# Patient Record
Sex: MALE | Race: WHITE | NOT HISPANIC OR LATINO | Employment: FULL TIME | URBAN - NONMETROPOLITAN AREA
[De-identification: names, ages, dates, MRNs, and addresses within clinical notes are randomized per-mention and may not be internally consistent; named-entity substitution may affect disease eponyms.]

---

## 2020-03-02 ENCOUNTER — OFFICE VISIT (OUTPATIENT)
Dept: FAMILY MEDICINE CLINIC | Facility: CLINIC | Age: 45
End: 2020-03-02
Payer: COMMERCIAL

## 2020-03-02 ENCOUNTER — APPOINTMENT (OUTPATIENT)
Dept: LAB | Facility: CLINIC | Age: 45
End: 2020-03-02
Payer: COMMERCIAL

## 2020-03-02 VITALS
WEIGHT: 207.6 LBS | SYSTOLIC BLOOD PRESSURE: 122 MMHG | BODY MASS INDEX: 30.75 KG/M2 | DIASTOLIC BLOOD PRESSURE: 80 MMHG | OXYGEN SATURATION: 99 % | HEIGHT: 69 IN | HEART RATE: 64 BPM

## 2020-03-02 DIAGNOSIS — J33.9 NASAL POLYPOSIS: ICD-10-CM

## 2020-03-02 DIAGNOSIS — Z00.00 ANNUAL PHYSICAL EXAM: Primary | ICD-10-CM

## 2020-03-02 DIAGNOSIS — Z00.00 ANNUAL PHYSICAL EXAM: ICD-10-CM

## 2020-03-02 LAB
ALBUMIN SERPL BCP-MCNC: 3.9 G/DL (ref 3.5–5)
ALP SERPL-CCNC: 63 U/L (ref 46–116)
ALT SERPL W P-5'-P-CCNC: 64 U/L (ref 12–78)
ANION GAP SERPL CALCULATED.3IONS-SCNC: 5 MMOL/L (ref 4–13)
AST SERPL W P-5'-P-CCNC: 29 U/L (ref 5–45)
BASOPHILS # BLD AUTO: 0.05 THOUSANDS/ΜL (ref 0–0.1)
BASOPHILS NFR BLD AUTO: 1 % (ref 0–1)
BILIRUB SERPL-MCNC: 0.69 MG/DL (ref 0.2–1)
BUN SERPL-MCNC: 18 MG/DL (ref 5–25)
CALCIUM SERPL-MCNC: 8.9 MG/DL (ref 8.3–10.1)
CHLORIDE SERPL-SCNC: 108 MMOL/L (ref 100–108)
CHOLEST SERPL-MCNC: 237 MG/DL (ref 50–200)
CO2 SERPL-SCNC: 29 MMOL/L (ref 21–32)
CREAT SERPL-MCNC: 1.13 MG/DL (ref 0.6–1.3)
EOSINOPHIL # BLD AUTO: 0.46 THOUSAND/ΜL (ref 0–0.61)
EOSINOPHIL NFR BLD AUTO: 7 % (ref 0–6)
ERYTHROCYTE [DISTWIDTH] IN BLOOD BY AUTOMATED COUNT: 12.3 % (ref 11.6–15.1)
GFR SERPL CREATININE-BSD FRML MDRD: 80 ML/MIN/1.73SQ M
GLUCOSE P FAST SERPL-MCNC: 113 MG/DL (ref 65–99)
HCT VFR BLD AUTO: 50.5 % (ref 36.5–49.3)
HDLC SERPL-MCNC: 34 MG/DL
HGB BLD-MCNC: 17 G/DL (ref 12–17)
IMM GRANULOCYTES # BLD AUTO: 0.01 THOUSAND/UL (ref 0–0.2)
IMM GRANULOCYTES NFR BLD AUTO: 0 % (ref 0–2)
LDLC SERPL CALC-MCNC: 169 MG/DL (ref 0–100)
LYMPHOCYTES # BLD AUTO: 2.57 THOUSANDS/ΜL (ref 0.6–4.47)
LYMPHOCYTES NFR BLD AUTO: 38 % (ref 14–44)
MCH RBC QN AUTO: 30.7 PG (ref 26.8–34.3)
MCHC RBC AUTO-ENTMCNC: 33.7 G/DL (ref 31.4–37.4)
MCV RBC AUTO: 91 FL (ref 82–98)
MONOCYTES # BLD AUTO: 0.38 THOUSAND/ΜL (ref 0.17–1.22)
MONOCYTES NFR BLD AUTO: 6 % (ref 4–12)
NEUTROPHILS # BLD AUTO: 3.24 THOUSANDS/ΜL (ref 1.85–7.62)
NEUTS SEG NFR BLD AUTO: 48 % (ref 43–75)
NONHDLC SERPL-MCNC: 203 MG/DL
NRBC BLD AUTO-RTO: 0 /100 WBCS
PLATELET # BLD AUTO: 168 THOUSANDS/UL (ref 149–390)
PMV BLD AUTO: 9.7 FL (ref 8.9–12.7)
POTASSIUM SERPL-SCNC: 4.3 MMOL/L (ref 3.5–5.3)
PROT SERPL-MCNC: 7.3 G/DL (ref 6.4–8.2)
RBC # BLD AUTO: 5.54 MILLION/UL (ref 3.88–5.62)
SODIUM SERPL-SCNC: 142 MMOL/L (ref 136–145)
TRIGL SERPL-MCNC: 171 MG/DL
TSH SERPL DL<=0.05 MIU/L-ACNC: 1.31 UIU/ML (ref 0.36–3.74)
WBC # BLD AUTO: 6.71 THOUSAND/UL (ref 4.31–10.16)

## 2020-03-02 PROCEDURE — 99396 PREV VISIT EST AGE 40-64: CPT | Performed by: FAMILY MEDICINE

## 2020-03-02 PROCEDURE — 80053 COMPREHEN METABOLIC PANEL: CPT

## 2020-03-02 PROCEDURE — 84443 ASSAY THYROID STIM HORMONE: CPT

## 2020-03-02 PROCEDURE — 80061 LIPID PANEL: CPT

## 2020-03-02 PROCEDURE — 85025 COMPLETE CBC W/AUTO DIFF WBC: CPT

## 2020-03-02 PROCEDURE — 36415 COLL VENOUS BLD VENIPUNCTURE: CPT

## 2020-03-02 RX ORDER — DIPHENOXYLATE HYDROCHLORIDE AND ATROPINE SULFATE 2.5; .025 MG/1; MG/1
1 TABLET ORAL DAILY
COMMUNITY

## 2020-03-02 RX ORDER — FLUTICASONE PROPIONATE 50 MCG
1 SPRAY, SUSPENSION (ML) NASAL DAILY
Qty: 1 BOTTLE | Refills: 5 | Status: SHIPPED | OUTPATIENT
Start: 2020-03-02

## 2020-03-02 NOTE — PROGRESS NOTES
Assessment/Plan:    Annual physical exam  Patient presented to the office today for his annual physical   Exam is unremarkable other than nasal congestion with nasal polyposis  He has a history of nasal polyps and has seen ENT for this in the past   He refused surgery  I prescribed Flonase  I have also recommended saline nasal spray  I ordered routine fasting blood work  I will contact the patient with results  Diagnoses and all orders for this visit:    Annual physical exam  -     CBC and differential; Future  -     Comprehensive metabolic panel; Future  -     Lipid panel; Future  -     TSH, 3rd generation with Free T4 reflex; Future    Nasal polyposis  -     fluticasone (FLONASE) 50 mcg/act nasal spray; 1 spray into each nostril daily    Other orders  -     multivitamin (THERAGRAN) TABS; Take 1 tablet by mouth daily          SuBMI Counseling: Body mass index is 30 66 kg/m²  The BMI is above normal  Nutrition recommendations include decreasing portion sizes, encouraging healthy choices of fruits and vegetables, decreasing fast food intake, consuming healthier snacks, limiting drinks that contain sugar and moderation in carbohydrate intake  Exercise recommendations include exercising 3-5 times per week  No pharmacotherapy was ordered  bjective:      Patient ID: Zhang Stallings is a 45 y o  male  This patient is a 57-year-old white male presents to the office today for his annual physical   Patient states that his insurance for requires him to have an annual physical in order for him to get a discount on his healthcare insurance  Patient feels well and has no complaints  He is exercising regularly  He tells me he gets frustrated because he thinks his muscle should be bigger than they are        The following portions of the patient's history were reviewed and updated as appropriate: allergies, current medications, past family history, past medical history, past social history, past surgical history and problem list     Review of Systems   HENT: Positive for congestion  Respiratory: Negative for cough, shortness of breath and wheezing  Cardiovascular: Negative for chest pain, palpitations and leg swelling  Gastrointestinal: Negative for abdominal distention, abdominal pain, blood in stool, constipation, diarrhea, nausea and vomiting  Objective:      /80 (BP Location: Left arm, Patient Position: Sitting, Cuff Size: Large)   Pulse 64   Ht 5' 9" (1 753 m)   Wt 94 2 kg (207 lb 9 6 oz)   SpO2 99%   BMI 30 66 kg/m²          Physical Exam   Constitutional: He appears well-developed and well-nourished  No distress  HENT:   Head: Normocephalic and atraumatic  Right Ear: External ear normal    Left Ear: External ear normal    Mouth/Throat: Oropharynx is clear and moist  No oropharyngeal exudate  Tympanic membranes are normal in appearance  Nasal mucosa was somewhat dry  Nasal polyps are present left more so than right   Eyes: Pupils are equal, round, and reactive to light  Conjunctivae are normal  No scleral icterus  Neck: Neck supple  No tracheal deviation present  No thyromegaly present  Cardiovascular: Normal rate, regular rhythm and normal heart sounds  Exam reveals no gallop and no friction rub  No murmur heard  Pulmonary/Chest: Effort normal and breath sounds normal  No stridor  No respiratory distress  He has no wheezes  He has no rales  Abdominal: Soft  Bowel sounds are normal  He exhibits no distension and no mass  There is no tenderness  There is no rebound and no guarding  There is no organomegaly noted   Musculoskeletal: Normal range of motion  He exhibits no edema, tenderness or deformity  Lymphadenopathy:     He has no cervical adenopathy  Psychiatric: He has a normal mood and affect  His behavior is normal  Judgment and thought content normal    Vitals reviewed      extremities:  Without cyanosis, clubbing, or edema

## 2020-03-03 NOTE — ASSESSMENT & PLAN NOTE
Patient presented to the office today for his annual physical   Exam is unremarkable other than nasal congestion with nasal polyposis  He has a history of nasal polyps and has seen ENT for this in the past   He refused surgery  I prescribed Flonase  I have also recommended saline nasal spray  I ordered routine fasting blood work  I will contact the patient with results

## 2020-07-14 ENCOUNTER — OFFICE VISIT (OUTPATIENT)
Dept: FAMILY MEDICINE CLINIC | Facility: CLINIC | Age: 45
End: 2020-07-14
Payer: COMMERCIAL

## 2020-07-14 VITALS
HEIGHT: 69 IN | OXYGEN SATURATION: 98 % | WEIGHT: 216.6 LBS | HEART RATE: 99 BPM | DIASTOLIC BLOOD PRESSURE: 80 MMHG | BODY MASS INDEX: 32.08 KG/M2 | TEMPERATURE: 98.1 F | SYSTOLIC BLOOD PRESSURE: 132 MMHG

## 2020-07-14 DIAGNOSIS — M75.51 BURSITIS OF RIGHT SHOULDER: Primary | ICD-10-CM

## 2020-07-14 PROCEDURE — 3008F BODY MASS INDEX DOCD: CPT | Performed by: FAMILY MEDICINE

## 2020-07-14 PROCEDURE — 99212 OFFICE O/P EST SF 10 MIN: CPT | Performed by: FAMILY MEDICINE

## 2020-07-14 PROCEDURE — 1036F TOBACCO NON-USER: CPT | Performed by: FAMILY MEDICINE

## 2020-07-14 RX ORDER — DICLOFENAC SODIUM 75 MG/1
75 TABLET, DELAYED RELEASE ORAL 2 TIMES DAILY
Qty: 60 TABLET | Refills: 0 | Status: SHIPPED | OUTPATIENT
Start: 2020-07-14 | End: 2020-11-09 | Stop reason: SDUPTHER

## 2020-07-14 NOTE — PROGRESS NOTES
Assessment/Plan:    Bursitis of right shoulder  Patient has been instructed to rest the shoulder  He will apply ice to the affected area 2-3 times per day for 10-15 minutes at a time  I started him on oral NSAIDs  He will take 1 twice a day with food  I think his shoulder will improve with these simple measures  If not, he should contact me  We would then bring him back to the office and consider a cortisone injection  Diagnoses and all orders for this visit:    Bursitis of right shoulder  -     diclofenac (VOLTAREN) 75 mg EC tablet; Take 1 tablet (75 mg total) by mouth 2 (two) times a day  -     Large joint arthrocentesis: R subacromial bursa  -     lidocaine (PF) (XYLOCAINE-MPF) 2 % injection 1 mL  -     methylPREDNISolone acetate (DEPO-MEDROL) injection 0 5 mL        Large joint arthrocentesis: R subacromial bursa  Date/Time: 7/22/2020 9:03 AM  Consent given by: patient  Supporting Documentation  Indications: pain   Procedure Details  Location: shoulder - R subacromial bursa  Needle size: 25 G  Ultrasound guidance: no  Approach: lateral  Medications administered: 1 mL lidocaine (PF) 2 %; 0 5 mL methylPREDNISolone acetate 80 mg/mL    Patient tolerance: patient tolerated the procedure well with no immediate complications  Dressing:  Sterile dressing applied        Subjective:      Patient ID: Alexey Diaz is a 45 y o  male  This patient is a 22-year-old white male who presents to the office today complaining of right shoulder pain x3 months duration  He is right-hand dominant  It began when he grabbed a door at work and open suddenly  He tells me was actually already sore for a while before that but that is really what put him over the edge  He finds it difficult to find a comfortable position to sleep at night  He is still lifting weights  Shoulder has not been getting better and as such, he scheduled the appointment  Pain does not radiate  Denies neck pain        The following portions of the patient's history were reviewed and updated as appropriate: allergies, current medications, past family history, past medical history, past social history, past surgical history and problem list     Review of Systems   Musculoskeletal: Negative for neck pain and neck stiffness  Neurological: Negative for weakness and numbness  Objective:      /80 (BP Location: Left arm, Patient Position: Sitting, Cuff Size: Large)   Pulse 99   Temp 98 1 °F (36 7 °C) (Temporal)   Ht 5' 8 5" (1 74 m)   Wt 98 2 kg (216 lb 9 6 oz)   SpO2 98%   BMI 32 45 kg/m²          Physical Exam   Constitutional: He appears well-developed and well-nourished  No distress  Musculoskeletal:   Patient has full range of motion of the shoulders in all planes of movement  Empty can sign is negative  Nelson impingement sign is negative  Neer impingement sign is negative  There was a positive painful arc  Apprehension test was negative  Sulcus test was negative  There is no tenderness noted to palpation  There is full range of motion of the cervical spine in all planes of movement  No paraspinal cervical muscle spasms or tenderness   Neurological: He displays normal reflexes  Cervical compression test is negative   Vitals reviewed

## 2020-07-14 NOTE — PATIENT INSTRUCTIONS
Shoulder Bursitis   WHAT YOU NEED TO KNOW:   What is shoulder bursitis? Shoulder bursitis is inflammation of the bursa in your shoulder  The bursa is a fluid-filled sac that acts as a cushion between a bone and a tendon  A tendon is a cord of strong tissue that connects muscles to bones  What causes shoulder bursitis? · An injury, such as a fall    · Bacterial infection    · Overuse of the shoulder, such as when you paint or swim    · Bony growths that rub against and irritate the bursa and tendons  What are the signs and symptoms of shoulder bursitis? · Pain when you move your shoulder or raise your arm over your head    · Decreased movement of your arm and shoulder    · Redness or swelling    · Crunching or popping when you move your shoulder    · Shoulder and arm weakness  How is shoulder bursitis diagnosed? Your healthcare provider will examine your shoulder and ask about your injury or activities  You may need any of the following:  · Blood tests: You may need blood drawn to check for infection  Healthcare providers may also check for diseases that may be causing your bursitis, such as rheumatoid arthritis  · X-rays: These pictures will show bone position problems, arthritis, or a fracture  · MRI:  This scan uses powerful magnets and a computer to take pictures of your shoulder  An MRI may show tissue damage or arthritis  You may be given dye to help the pictures show up better  Tell the healthcare provider if you have ever had an allergic reaction to contrast dye  Do not enter the MRI room with anything metal  Metal can cause serious injury  Tell the healthcare provider if you have any metal in or on your body  · Fluid culture:  Healthcare providers use a needle to drain fluid from your bursa  The fluid will be sent to a lab and tested for infection  Removal of bursa fluid may also help relieve your symptoms  How is shoulder bursitis treated?    · Medicine:      ¨ NSAIDs:  These medicines decrease swelling, pain, and fever  NSAIDs are available without a doctor's order  Ask your healthcare provider which medicine is right for you  Ask how much to take and when to take it  Take as directed  NSAIDs can cause stomach bleeding and kidney problems if not taken correctly  ¨ Antibiotics: These help fight an infection caused by bacteria  You may need antibiotics if your bursitis is caused by infection  ¨ Steroid injection: This shot will help decrease pain and swelling  · Bursectomy: This is surgery to remove your bursa  It is only done when other treatments do not work  What are the risks of shoulder bursitis? The infection may spread to nearby joints  You may have long-term bursitis  This may include pain and severe limitation of movement  How can I manage my symptoms? · Rest:  Rest your shoulder as much as possible to decrease pain and swelling  Slowly start to do more each day  Return to your daily activities as directed  · Ice:  Ice helps decrease swelling and pain  Ice may also help prevent tissue damage  Use an ice pack, or put crushed ice in a plastic bag  Cover it with a towel and place it on your shoulder for 15 to 20 minutes, 3 to 4 times each day, as directed  · Heat:  Heat helps decrease pain and stiffness  Apply heat on the area for 15 to 20 minutes, 3 to 4 times each day, as directed  · Sleep position:  Try to avoid lying on your injured shoulder  You may be more comfortable if you sleep on your stomach or back  · Physical therapy:  A physical therapist teaches you exercises to help improve movement and strength, and to decrease pain  How can I prevent shoulder bursitis? Always stretch and do warmup and cool-down exercises before and after you exercise  This will help loosen your muscles and decrease stress on your shoulder  Rest between workouts  When should I contact my healthcare provider? · Your redness, pain, and swelling increase      · Your symptoms do not improve with treatment  · You have a fever  · You have questions or concerns about your condition or care  CARE AGREEMENT:   You have the right to help plan your care  Learn about your health condition and how it may be treated  Discuss treatment options with your caregivers to decide what care you want to receive  You always have the right to refuse treatment  The above information is an  only  It is not intended as medical advice for individual conditions or treatments  Talk to your doctor, nurse or pharmacist before following any medical regimen to see if it is safe and effective for you  © 2017 [facility] Information is for End User's use only and may not be sold, redistributed or otherwise used for commercial purposes  All illustrations and images included in CareNotes® are the copyrighted property of A D A M , Inc  or [employer]

## 2020-07-22 PROBLEM — M75.51 BURSITIS OF RIGHT SHOULDER: Status: ACTIVE | Noted: 2020-07-22

## 2020-07-22 RX ORDER — METHYLPREDNISOLONE ACETATE 80 MG/ML
0.5 INJECTION, SUSPENSION INTRA-ARTICULAR; INTRALESIONAL; INTRAMUSCULAR; SOFT TISSUE
Status: DISCONTINUED | OUTPATIENT
Start: 2020-07-22 | End: 2020-08-28

## 2020-07-22 RX ORDER — LIDOCAINE HYDROCHLORIDE 20 MG/ML
1 INJECTION, SOLUTION EPIDURAL; INFILTRATION; INTRACAUDAL; PERINEURAL
Status: DISCONTINUED | OUTPATIENT
Start: 2020-07-22 | End: 2020-08-28

## 2020-07-22 NOTE — ASSESSMENT & PLAN NOTE
Patient has been instructed to rest the shoulder  He will apply ice to the affected area 2-3 times per day for 10-15 minutes at a time  I started him on oral NSAIDs  He will take 1 twice a day with food  I think his shoulder will improve with these simple measures  If not, he should contact me  We would then bring him back to the office and consider a cortisone injection

## 2020-08-03 ENCOUNTER — TELEPHONE (OUTPATIENT)
Dept: FAMILY MEDICINE CLINIC | Facility: CLINIC | Age: 45
End: 2020-08-03

## 2020-08-03 NOTE — TELEPHONE ENCOUNTER
Patient called  Patient stated that you did not inject his shoulder the day he was here  He is getting billed for services that were not performed  Stated that you just checked his shoulder and moved it around  Stated that you prescribed medication and he has been using that  Please advise

## 2020-08-28 NOTE — PROGRESS NOTES
Assessment/Plan:    Bursitis of right shoulder  Patient has been instructed to rest the shoulder  He will apply ice to the affected area 2-3 times per day for 10-15 minutes at a time  I started him on oral NSAIDs  He will take 1 twice a day with food  I think his shoulder will improve with these simple measures  If not, he should contact me  We would then bring him back to the office and consider a cortisone injection  Diagnoses and all orders for this visit:    Bursitis of right shoulder  -     diclofenac (VOLTAREN) 75 mg EC tablet; Take 1 tablet (75 mg total) by mouth 2 (two) times a day  -     Cancel: Large joint arthrocentesis: R subacromial bursa  -     Discontinue: lidocaine (PF) (XYLOCAINE-MPF) 2 % injection 1 mL  -     Discontinue: methylPREDNISolone acetate (DEPO-MEDROL) injection 0 5 mL          Subjective:      Patient ID: Mihaela Pires is a 45 y o  male  This patient is a 53-year-old white male presents to the office today complaining of right shoulder pain  His shoulder pain began 3 months ago  He is right-hand dominant  He was at work  He tells me he grabbed a door which suddenly opened  This isaías his shoulder  He tells me it was already sore for a while before this  However, he claims this put him over the edge  He is still lifting weights  He tells me he scheduled the appointment  The shoulder has been getting better but he decided to keep the appointment because it was already scheduled  He denies any radiation of the pain  He denies any pain in his neck  The following portions of the patient's history were reviewed and updated as appropriate: allergies, current medications, past family history, past medical history, past social history, past surgical history and problem list     Review of Systems   Musculoskeletal: Negative for neck pain and neck stiffness  Denies any pain radiating into the arm   Neurological: Negative for weakness and numbness  Objective:      /80 (BP Location: Left arm, Patient Position: Sitting, Cuff Size: Large)   Pulse 99   Temp 98 1 °F (36 7 °C) (Temporal)   Ht 5' 8 5" (1 74 m)   Wt 98 2 kg (216 lb 9 6 oz)   SpO2 98%   BMI 32 45 kg/m²          Physical Exam  Constitutional:       General: He is not in acute distress  Appearance: He is well-developed and well-nourished  Cardiovascular:      Rate and Rhythm: Normal rate and regular rhythm  Heart sounds: S1 normal and S2 normal  No murmur  No friction rub  Pulmonary:      Effort: Pulmonary effort is normal       Breath sounds: Normal breath sounds  No wheezing or rales  Musculoskeletal:      Comments: The empty can sign is negative  Nelson impingement sign is negative  Neer impingement sign is negative  There is a mildly positive painful arc  There is full range of motion of the shoulders  There is a negative apprehension test and a negative sulcus test    Neurological:      Comments: Deep tendon reflexes are 2/4  Motor strength is 5/5  Cervical compression test is negative

## 2020-11-09 ENCOUNTER — OFFICE VISIT (OUTPATIENT)
Dept: FAMILY MEDICINE CLINIC | Facility: CLINIC | Age: 45
End: 2020-11-09
Payer: COMMERCIAL

## 2020-11-09 VITALS
WEIGHT: 217.9 LBS | HEIGHT: 69 IN | TEMPERATURE: 98.5 F | BODY MASS INDEX: 32.27 KG/M2 | HEART RATE: 86 BPM | SYSTOLIC BLOOD PRESSURE: 136 MMHG | DIASTOLIC BLOOD PRESSURE: 78 MMHG | OXYGEN SATURATION: 97 %

## 2020-11-09 DIAGNOSIS — M75.51 BURSITIS OF RIGHT SHOULDER: ICD-10-CM

## 2020-11-09 PROCEDURE — 20610 DRAIN/INJ JOINT/BURSA W/O US: CPT | Performed by: FAMILY MEDICINE

## 2020-11-09 PROCEDURE — 3008F BODY MASS INDEX DOCD: CPT | Performed by: FAMILY MEDICINE

## 2020-11-09 PROCEDURE — 99212 OFFICE O/P EST SF 10 MIN: CPT | Performed by: FAMILY MEDICINE

## 2020-11-09 PROCEDURE — 1036F TOBACCO NON-USER: CPT | Performed by: FAMILY MEDICINE

## 2020-11-09 RX ORDER — LIDOCAINE HYDROCHLORIDE 20 MG/ML
1 INJECTION, SOLUTION EPIDURAL; INFILTRATION; INTRACAUDAL; PERINEURAL
Status: COMPLETED | OUTPATIENT
Start: 2020-11-09 | End: 2020-11-09

## 2020-11-09 RX ORDER — METHYLPREDNISOLONE ACETATE 80 MG/ML
0.5 INJECTION, SUSPENSION INTRA-ARTICULAR; INTRALESIONAL; INTRAMUSCULAR; SOFT TISSUE
Status: COMPLETED | OUTPATIENT
Start: 2020-11-09 | End: 2020-11-09

## 2020-11-09 RX ORDER — DICLOFENAC SODIUM 75 MG/1
TABLET, DELAYED RELEASE ORAL
Qty: 60 TABLET | Refills: 1 | Status: SHIPPED | OUTPATIENT
Start: 2020-11-09

## 2020-11-09 RX ADMIN — METHYLPREDNISOLONE ACETATE 0.5 ML: 80 INJECTION, SUSPENSION INTRA-ARTICULAR; INTRALESIONAL; INTRAMUSCULAR; SOFT TISSUE at 18:51

## 2020-11-09 RX ADMIN — LIDOCAINE HYDROCHLORIDE 1 ML: 20 INJECTION, SOLUTION EPIDURAL; INFILTRATION; INTRACAUDAL; PERINEURAL at 18:51

## 2021-04-08 ENCOUNTER — TELEPHONE (OUTPATIENT)
Dept: FAMILY MEDICINE CLINIC | Facility: CLINIC | Age: 46
End: 2021-04-08

## 2021-09-21 ENCOUNTER — TELEMEDICINE (OUTPATIENT)
Dept: FAMILY MEDICINE CLINIC | Facility: CLINIC | Age: 46
End: 2021-09-21
Payer: COMMERCIAL

## 2021-09-21 VITALS — TEMPERATURE: 98.4 F | WEIGHT: 214 LBS | HEIGHT: 68 IN | BODY MASS INDEX: 32.43 KG/M2

## 2021-09-21 DIAGNOSIS — B34.9 VIRAL INFECTION, UNSPECIFIED: Primary | ICD-10-CM

## 2021-09-21 PROCEDURE — U0003 INFECTIOUS AGENT DETECTION BY NUCLEIC ACID (DNA OR RNA); SEVERE ACUTE RESPIRATORY SYNDROME CORONAVIRUS 2 (SARS-COV-2) (CORONAVIRUS DISEASE [COVID-19]), AMPLIFIED PROBE TECHNIQUE, MAKING USE OF HIGH THROUGHPUT TECHNOLOGIES AS DESCRIBED BY CMS-2020-01-R: HCPCS | Performed by: FAMILY MEDICINE

## 2021-09-21 PROCEDURE — U0005 INFEC AGEN DETEC AMPLI PROBE: HCPCS | Performed by: FAMILY MEDICINE

## 2021-09-21 PROCEDURE — 99213 OFFICE O/P EST LOW 20 MIN: CPT | Performed by: FAMILY MEDICINE

## 2021-09-21 NOTE — ASSESSMENT & PLAN NOTE
Patient has symptoms which are quite compatible with COVID-19 virus infection  I recommended that the patient come to the office for COVID-19 virus test   I have also asked the patient to quarantine at home until results of the tests are back  He should also try to isolate from family members  If he is around his family, I asked him to wear mask  I asked the patient to check his temperature at least twice a day until I call him  He should call me immediately if he has any shortness of breath  I told the patient he may take Tylenol as needed for symptoms  Robitussin DM has also been recommended  Further follow-up pending COVID-19 PCR test results  A note for work has been given

## 2021-09-21 NOTE — PROGRESS NOTES
COVID-19 Outpatient Progress Note    Assessment/Plan:    Problem List Items Addressed This Visit        Other    Viral infection, unspecified - Primary      Patient has symptoms which are quite compatible with COVID-19 virus infection  I recommended that the patient come to the office for COVID-19 virus test   I have also asked the patient to quarantine at home until results of the tests are back  He should also try to isolate from family members  If he is around his family, I asked him to wear mask  I asked the patient to check his temperature at least twice a day until I call him  He should call me immediately if he has any shortness of breath  I told the patient he may take Tylenol as needed for symptoms  Robitussin DM has also been recommended  Further follow-up pending COVID-19 PCR test results  A note for work has been given  Relevant Orders    Novel Coronavirus (Covid-19),PCR [facility] - Collected in Office         Disposition:     I recommended the patient to come to our office to perform PCR testing for COVID-19  I have spent 15 minutes directly with the patient  Greater than 50% of this time was spent in counseling/coordination of care regarding: instructions for management, patient and family education and impressions  Verification of patient location:    Patient is located in the following state in which I hold an active license [place]    Encounter provider Emily Guardado DO    Provider located at [address] [place] [address] [facility] [address] [address] [phone]    Recent Visits  No visits were found meeting these conditions    Showing recent visits within past 7 days and meeting all other requirements  Today's Visits  Date Type Provider Dept   09/21/21 Telemedicine Emily Guardado DO [facility]   Showing today's visits and meeting all other requirements  Future Appointments  No visits were found meeting these conditions  Showing future appointments within next 150 days and meeting all other requirements     This virtual check-in was done via [employer] and patient was informed that this is a secure, HIPAA-compliant platform  He agrees to proceed  Patient agrees to participate in a virtual check in via telephone or video visit instead of presenting to the office to address urgent/immediate medical needs  Patient is aware this is a billable service  After connecting through [facility], the patient was identified by name and date of birth  Scott Reilly was informed that this was a telemedicine visit and that the exam was being conducted confidentially over secure lines  My office door was closed  No one else was in the room  Scott Reilly acknowledged consent and understanding of privacy and security of the telemedicine visit  I informed the patient that I have reviewed his record in Epic and presented the opportunity for him to ask any questions regarding the visit today  The patient agreed to participate  Subjective:   Scott Reilly is a 42 y o  male who is concerned about COVID-19  Patient's symptoms include nasal congestion, cough, chest tightness, myalgias and headache  Patient denies fever, chills, fatigue, malaise, rhinorrhea, sore throat, anosmia, loss of taste, shortness of breath, abdominal pain, nausea, vomiting and diarrhea       Date of symptom onset: 9/20/2021  COVID-19 vaccination status: Partially vaccinated    Exposure:   Contact with a person who is under investigation (PUI) for or who is positive for COVID-19 within the last 14 days?: No    Hospitalized recently for fever and/or lower respiratory symptoms?: No      Currently a healthcare worker that is involved in direct patient care?: No      Works in a special setting where the risk of COVID-19 transmission may be high? (this may include long-term care, correctional and retirement facilities; homeless shelters; assisted-living facilities and group homes ): No      Resident in a special setting where the risk of COVID-19 transmission may be high? (this may include long-term care, correctional and [facility] facilities; homeless shelters; assisted-living facilities and group homes ): No        Reports sick contact, a co-worker  The individual was never tested for COVID-19    No results found for: [address] [address] [facility], [facility]  History reviewed  No pertinent past medical history  Past Surgical History:   Procedure Laterality Date    HIP SURGERY  2018    JOINT REPLACEMENT  2018    WISDOM TOOTH EXTRACTION       Current Outpatient Medications   Medication Sig Dispense Refill    diclofenac (VOLTAREN) 75 mg EC tablet Take 1 twice a day with food as needed for shoulder pain 60 tablet 1    multivitamin (THERAGRAN) TABS Take 1 tablet by mouth daily      fluticasone (FLONASE) 50 mcg/act nasal spray 1 spray into each nostril daily (Patient not taking: Reported on 7/14/2020) 1 Bottle 5     No current facility-administered medications for this visit  No Known Allergies    Review of Systems   Constitutional: Positive for diaphoresis  Negative for chills, fatigue and fever  HENT: Positive for congestion  Negative for rhinorrhea and sore throat  Positive for a current dental infection, for which he is taking amoxicillin   Respiratory: Positive for cough and chest tightness  Negative for shortness of breath  Gastrointestinal: Negative for abdominal pain, diarrhea, nausea and vomiting  Musculoskeletal: Positive for myalgias  Neurological: Positive for headaches  Objective:    Vitals:    09/21/21 1530   Temp: 98 4 °F (36 9 °C)   Weight: 97 1 kg (214 lb)   Height: 5' 8" (1 727 m)       Physical Exam  Vitals reviewed  Constitutional:       Comments: This is a 61-year-old white male who appears ill but nontoxic   HENT:      Head: Normocephalic and atraumatic        Right Ear: External ear normal       Left Ear: External ear normal       Mouth/Throat:      Mouth: Mucous membranes are moist       Pharynx: Oropharynx is clear  No oropharyngeal exudate or posterior oropharyngeal erythema  Eyes:      General: No scleral icterus  Right eye: No discharge  Left eye: No discharge  Conjunctiva/sclera: Conjunctivae normal    Cardiovascular:      Comments: There is no cyanosis or clubbing of the digits  Pulmonary:      Comments:  Patient did not cough during the entire video visit  He was not tachypneic  He did not appear to be in any respiratory distress  Musculoskeletal:      Cervical back: Normal range of motion  Lymphadenopathy:      Cervical: No cervical adenopathy  VIRTUAL VISIT DISCLAIMER    Gianna Alvarado verbally agrees to participate in [employer]  Pt is aware that [employer] could be limited without vital signs or the ability to perform a full hands-on physical exam  Artur Gaines understands he or the provider may request at any time to terminate the video visit and request the patient to seek care or treatment in person

## 2021-09-22 ENCOUNTER — TELEPHONE (OUTPATIENT)
Dept: FAMILY MEDICINE CLINIC | Facility: CLINIC | Age: 46
End: 2021-09-22

## 2021-09-22 LAB — SARS-COV-2 RNA RESP QL NAA+PROBE: POSITIVE

## 2021-09-22 NOTE — TELEPHONE ENCOUNTER
----- Message from Heidy Samaniego sent at 9/22/2021  2:26 PM EDT -----    ----- Message -----  From: Tim Pope DO  Sent: 9/22/2021   2:25 PM EDT  To: Kahlil Dorsey MA    Patient is positive for COVID-19   Needs video visit

## 2021-09-23 ENCOUNTER — TELEMEDICINE (OUTPATIENT)
Dept: FAMILY MEDICINE CLINIC | Facility: CLINIC | Age: 46
End: 2021-09-23
Payer: COMMERCIAL

## 2021-09-23 VITALS — HEIGHT: 68 IN | TEMPERATURE: 98.1 F | BODY MASS INDEX: 32.43 KG/M2 | WEIGHT: 214 LBS

## 2021-09-23 DIAGNOSIS — U07.1 COVID-19 VIRUS INFECTION: Primary | ICD-10-CM

## 2021-09-23 PROCEDURE — 99213 OFFICE O/P EST LOW 20 MIN: CPT | Performed by: FAMILY MEDICINE

## 2021-09-23 PROCEDURE — 3725F SCREEN DEPRESSION PERFORMED: CPT | Performed by: FAMILY MEDICINE

## 2021-09-23 NOTE — ASSESSMENT & PLAN NOTE
This patient has COVID-19 virus infection  Today is day #3  Patient is already improving  I have recommended that the patient start aspirin 81 milligrams daily in addition to vitamin-C, vitamin-D, and zinc   I asked the patient to check his temperature at least twice a day and p r n  He needs to call me immediately if he has any shortness of breath  I told the patient he must quarantine at home and he must isolate from his family  I did discuss with him CDC guidelines for close contacts, namely his daughter and his significant other  I advised the patient against excessive bed rest   The patient is a   candidate for monoclonal antibody treatment  I offered the therapy to him but he declined  He is partially vaccinated  He got his 1st shot on September 10  I told the patient that the earliest I can take him out of quarantine will be October 1st   He should plan to get his 2nd COVID shot on the regularly scheduled date  I told the patient to drink plenty of fluids  He may take Tylenol as needed for symptoms as well as Robitussin DM  He should call me if he has any questions  I stressed to the importance of calling me if he has any shortness of breath  I will schedule him a follow-up visit

## 2021-09-23 NOTE — PROGRESS NOTES
COVID-19 Outpatient Progress Note    Assessment/Plan:    Problem List Items Addressed This Visit        Other    COVID-19 virus infection - Primary       This patient has COVID-19 virus infection  Today is day #3  Patient is already improving  I have recommended that the patient start aspirin 81 milligrams daily in addition to vitamin-C, vitamin-D, and zinc   I asked the patient to check his temperature at least twice a day and p r n  He needs to call me immediately if he has any shortness of breath  I told the patient he must quarantine at home and he must isolate from his family  I did discuss with him CDC guidelines for close contacts, namely his daughter and his significant other  I advised the patient against excessive bed rest   The patient is a   candidate for monoclonal antibody treatment  I offered the therapy to him but he declined  He is partially vaccinated  He got his 1st shot on September 10  I told the patient that the earliest I can take him out of quarantine will be October 1st   He should plan to get his 2nd COVID shot on the regularly scheduled date  I told the patient to drink plenty of fluids  He may take Tylenol as needed for symptoms as well as Robitussin DM  He should call me if he has any questions  I stressed to the importance of calling me if he has any shortness of breath  I will schedule him a follow-up visit  Disposition:     I recommended continued isolation until at least 24 hours have passed since recovery defined as resolution of fever without the use of fever-reducing medications AND improvement in COVID symptoms AND 10 days have passed since onset of symptoms (or 10 days have passed since date of first positive viral diagnostic test for asymptomatic patients)  I have spent 16 minutes directly with the patient   Greater than 50% of this time was spent in counseling/coordination of care regarding: risks and benefits of treatment options, instructions for management, patient and family education and impressions  Verification of patient location:    Patient is located in the following state in which I hold an active license [place]    Encounter provider Kirk Angulo DO    Provider located at [address] [place] [id number]  [phone]    Recent Visits  Date Type Provider Dept   09/22/21 Telephone Harris Anthony, [facility]   09/21/21 [address], DO [facility]   Showing recent visits within past 7 days and meeting all other requirements  Today's Visits  Date Type Provider Dept   09/23/21 Telemedicine Kirk Angulo DO [facility]   Showing today's visits and meeting all other requirements  Future Appointments  No visits were found meeting these conditions  Showing future appointments within next 150 days and meeting all other requirements     This virtual check-in was done via [employer] and patient was informed that this is a secure, HIPAA-compliant platform  He agrees to proceed  Patient agrees to participate in a virtual check in via telephone or video visit instead of presenting to the office to address urgent/immediate medical needs  Patient is aware this is a billable service  After connecting through [facility], the patient was identified by name and date of birth  Yaz Paz was informed that this was a telemedicine visit and that the exam was being conducted confidentially over secure lines  My office door was closed  No one else was in the room  Yaz Paz acknowledged consent and understanding of privacy and security of the telemedicine visit  I informed the patient that I have reviewed his record in Epic and presented the opportunity for him to ask any questions regarding the visit today  The patient agreed to participate      Subjective:   Yaz Paz is a 42 y o  male who has been screened for COVID-19  Date of symptom onset: 9/20/2021  Date of positive COVID-19 PCR: 9/21/2021  COVID-19 vaccination status: Partially vaccinated    Lab Results   Component Value Date    SARSCOV2 Positive (A) 09/21/2021     History reviewed  No pertinent past medical history  Past Surgical History:   Procedure Laterality Date    HIP SURGERY  2018    JOINT REPLACEMENT  2018    WISDOM TOOTH EXTRACTION       Current Outpatient Medications   Medication Sig Dispense Refill    diclofenac (VOLTAREN) 75 mg EC tablet Take 1 twice a day with food as needed for shoulder pain 60 tablet 1    multivitamin (THERAGRAN) TABS Take 1 tablet by mouth daily      fluticasone (FLONASE) 50 mcg/act nasal spray 1 spray into each nostril daily (Patient not taking: Reported on 7/14/2020) 1 Bottle 5     No current facility-administered medications for this visit  No Known Allergies    Review of Systems    Objective:    Vitals:    09/23/21 0917   Temp: 98 1 °F (36 7 °C)   Weight: 97 1 kg (214 lb)   Height: 5' 8" (1 727 m)       Physical Exam  Vitals reviewed  Constitutional:       Comments: This is a 68-year-old white male who appears his stated age  He is in no apparent distress   HENT:      Head: Normocephalic and atraumatic  Right Ear: External ear normal       Left Ear: External ear normal       Mouth/Throat:      Mouth: Mucous membranes are moist       Pharynx: Oropharynx is clear  No oropharyngeal exudate or posterior oropharyngeal erythema  Eyes:      General: No scleral icterus  Right eye: No discharge  Left eye: No discharge  Conjunctiva/sclera: Conjunctivae normal    Cardiovascular:      Comments:  No cyanosis or clubbing of the digits  Pulmonary:      Comments:  Patient did not cough during the entire video visit  He was not tachypneic  He did not appear to be in any respiratory distress  Lymphadenopathy:      Cervical: No cervical adenopathy           VIRTUAL VISIT DISCLAIMER    Moira Christianson verbally agrees to participate in [employer]  Pt is aware that [employer] could be limited without vital signs or the ability to perform a full hands-on physical exam  Artur Haney understands he or the provider may request at any time to terminate the video visit and request the patient to seek care or treatment in person

## 2021-09-28 ENCOUNTER — TELEMEDICINE (OUTPATIENT)
Dept: FAMILY MEDICINE CLINIC | Facility: CLINIC | Age: 46
End: 2021-09-28
Payer: COMMERCIAL

## 2021-09-28 VITALS — TEMPERATURE: 97.7 F | HEIGHT: 68 IN | BODY MASS INDEX: 32.43 KG/M2 | WEIGHT: 214 LBS

## 2021-09-28 DIAGNOSIS — U07.1 COVID-19 VIRUS INFECTION: Primary | ICD-10-CM

## 2021-09-28 PROCEDURE — 1036F TOBACCO NON-USER: CPT | Performed by: FAMILY MEDICINE

## 2021-09-28 PROCEDURE — 99213 OFFICE O/P EST LOW 20 MIN: CPT | Performed by: FAMILY MEDICINE

## 2021-09-28 PROCEDURE — 3008F BODY MASS INDEX DOCD: CPT | Performed by: FAMILY MEDICINE

## 2021-09-28 NOTE — ASSESSMENT & PLAN NOTE
Patient has COVID-19 virus infection  Today is day #8  Symptoms are resolving and the patient feels good  The patient will need to continue to remain in quarantine  I told him as long as he remains afebrile, he can break quarantine return to work on Friday, October 1st   In the meantime, the patient is expected to continue to check his temperature at least twice a day  He should call if he develops fever  Again, I told the patient that shortness of breath would be an emergency  I am not expecting any problems now but if he develops shortness of breath, he should go right to the emergency room  The patient is partially vaccinated  He declined monoclonal antibody therapy  As such, I recommended that he get his 2nd COVID-19 virus vaccine on time  Patient should continue his vitamin preparations at this time  He was instructed to call if he has any problems   I answered patient's questions

## 2021-09-28 NOTE — PROGRESS NOTES
COVID-19 Outpatient Progress Note    Assessment/Plan:    Problem List Items Addressed This Visit        Other    COVID-19 virus infection - Primary     Patient has COVID-19 virus infection  Today is day #8  Symptoms are resolving and the patient feels good  The patient will need to continue to remain in quarantine  I told him as long as he remains afebrile, he can break quarantine return to work on Friday, October 1st   In the meantime, the patient is expected to continue to check his temperature at least twice a day  He should call if he develops fever  Again, I told the patient that shortness of breath would be an emergency  I am not expecting any problems now but if he develops shortness of breath, he should go right to the emergency room  The patient is partially vaccinated  He declined monoclonal antibody therapy  As such, I recommended that he get his 2nd COVID-19 virus vaccine on time  Patient should continue his vitamin preparations at this time  He was instructed to call if he has any problems  I answered patient's questions              Disposition:     I recommended continued isolation until at least 24 hours have passed since recovery defined as resolution of fever without the use of fever-reducing medications AND improvement in COVID symptoms AND 10 days have passed since onset of symptoms (or 10 days have passed since date of first positive viral diagnostic test for asymptomatic patients)  I have spent 12 minutes directly with the patient  Greater than 50% of this time was spent in counseling/coordination of care regarding: prognosis, patient and family education and impressions          Verification of patient location:    Patient is located in the following state in which I hold an active license [place]    Encounter provider Kirby Yanez DO    Provider located at [address] [place] [address] [place] [address]  [phone]    Recent Visits  Date Type Provider Dept   09/23/21 Telemedicine Lauren Villanueva, DO [facility]   09/22/21 Telephone Heidy Zamarripa [facility]   09/21/21 Telemedicine Lauren Villanueva, DO [facility]   Showing recent visits within past 7 days and meeting all other requirements  Today's Visits  Date Type Provider Dept   09/28/21 Telemedicine Lauren Villanueva, DO [facility]   Showing today's visits and meeting all other requirements  Future Appointments  No visits were found meeting these conditions  Showing future appointments within next 150 days and meeting all other requirements     This virtual check-in was done via [employer] and patient was informed that this is a secure, HIPAA-compliant platform  He agrees to proceed  Patient agrees to participate in a virtual check in via telephone or video visit instead of presenting to the office to address urgent/immediate medical needs  Patient is aware this is a billable service  After connecting through [facility], the patient was identified by name and date of birth  Chaitanya Albrecht was informed that this was a telemedicine visit and that the exam was being conducted confidentially over secure lines  My office door was closed  No one else was in the room  Chaitanya Albrecht acknowledged consent and understanding of privacy and security of the telemedicine visit  I informed the patient that I have reviewed his record in Epic and presented the opportunity for him to ask any questions regarding the visit today  The patient agreed to participate  Subjective:   Chaitanya Albrecht is a 42 y o  male who has been screened for COVID-19  Symptom change since last report: resolving  Patient's symptoms include rhinorrhea, anosmia, loss of taste and cough   Patient denies fever, chills, fatigue, malaise, congestion, sore throat, shortness of breath, chest tightness, abdominal pain, nausea, vomiting, diarrhea, myalgias and headaches  Date of symptom onset: 9/20/2021  Date of positive COVID-19 PCR: 9/21/2021  COVID-19 vaccination status: Partially vaccinated    Antonio Ying has been staying home and has isolated themselves in his home  He is taking care to not share personal items and is cleaning all surfaces that are touched often, like counters, tabletops, and doorknobs using household cleaning sprays or wipes  He is wearing a mask when he leaves his room  Lab Results   Component Value Date    SARSCOV2 Positive (A) 09/21/2021     History reviewed  No pertinent past medical history  Past Surgical History:   Procedure Laterality Date    HIP SURGERY  2018    JOINT REPLACEMENT  2018    WISDOM TOOTH EXTRACTION       Current Outpatient Medications   Medication Sig Dispense Refill    diclofenac (VOLTAREN) 75 mg EC tablet Take 1 twice a day with food as needed for shoulder pain 60 tablet 1    multivitamin (THERAGRAN) TABS Take 1 tablet by mouth daily      fluticasone (FLONASE) 50 mcg/act nasal spray 1 spray into each nostril daily (Patient not taking: Reported on 7/14/2020) 1 Bottle 5     No current facility-administered medications for this visit  No Known Allergies    Review of Systems   Constitutional: Negative for chills, fatigue and fever  HENT: Positive for rhinorrhea  Negative for congestion and sore throat  Respiratory: Positive for cough  Negative for chest tightness and shortness of breath  Gastrointestinal: Negative for abdominal pain, diarrhea, nausea and vomiting  Musculoskeletal: Negative for myalgias  Neurological: Negative for headaches  Objective:    Vitals:    09/28/21 1048   Temp: 97 7 °F (36 5 °C)   Weight: 97 1 kg (214 lb)   Height: 5' 8" (1 727 m)       Physical Exam  Vitals reviewed  Constitutional:       Comments: This is a 79-year-old white male who appears his stated age  He is nonseptic in appearance  He does not look ill     HENT:      Head: Normocephalic and atraumatic  Pulmonary:      Comments:   Patient did not cough during his entire video visit  He was not tachypneic  He did not appear to be in any distress        VIRTUAL VISIT DISCLAIMER    Romina Tijerina verbally agrees to participate in [employer]  Pt is aware that [employer] could be limited without vital signs or the ability to perform a full hands-on physical exam  Artur Pittman understands he or the provider may request at any time to terminate the video visit and request the patient to seek care or treatment in person

## 2021-10-01 ENCOUNTER — TELEPHONE (OUTPATIENT)
Dept: FAMILY MEDICINE CLINIC | Facility: CLINIC | Age: 46
End: 2021-10-01

## 2022-02-07 ENCOUNTER — APPOINTMENT (OUTPATIENT)
Dept: LAB | Facility: CLINIC | Age: 47
End: 2022-02-07
Payer: COMMERCIAL

## 2022-02-07 ENCOUNTER — OFFICE VISIT (OUTPATIENT)
Dept: FAMILY MEDICINE CLINIC | Facility: CLINIC | Age: 47
End: 2022-02-07
Payer: COMMERCIAL

## 2022-02-07 VITALS
TEMPERATURE: 98.2 F | BODY MASS INDEX: 32.92 KG/M2 | DIASTOLIC BLOOD PRESSURE: 76 MMHG | SYSTOLIC BLOOD PRESSURE: 124 MMHG | OXYGEN SATURATION: 98 % | HEIGHT: 68 IN | HEART RATE: 81 BPM | WEIGHT: 217.2 LBS

## 2022-02-07 DIAGNOSIS — Z00.00 ANNUAL PHYSICAL EXAM: Primary | ICD-10-CM

## 2022-02-07 DIAGNOSIS — Z00.00 ANNUAL PHYSICAL EXAM: ICD-10-CM

## 2022-02-07 LAB
ALBUMIN SERPL BCP-MCNC: 4.1 G/DL (ref 3.5–5)
ALP SERPL-CCNC: 58 U/L (ref 46–116)
ALT SERPL W P-5'-P-CCNC: 51 U/L (ref 12–78)
ANION GAP SERPL CALCULATED.3IONS-SCNC: 5 MMOL/L (ref 4–13)
AST SERPL W P-5'-P-CCNC: 27 U/L (ref 5–45)
BASOPHILS # BLD AUTO: 0.06 THOUSANDS/ΜL (ref 0–0.1)
BASOPHILS NFR BLD AUTO: 1 % (ref 0–1)
BILIRUB SERPL-MCNC: 0.99 MG/DL (ref 0.2–1)
BUN SERPL-MCNC: 15 MG/DL (ref 5–25)
CALCIUM SERPL-MCNC: 9.9 MG/DL (ref 8.3–10.1)
CHLORIDE SERPL-SCNC: 102 MMOL/L (ref 100–108)
CHOLEST SERPL-MCNC: 288 MG/DL
CO2 SERPL-SCNC: 29 MMOL/L (ref 21–32)
CREAT SERPL-MCNC: 1.33 MG/DL (ref 0.6–1.3)
EOSINOPHIL # BLD AUTO: 0.64 THOUSAND/ΜL (ref 0–0.61)
EOSINOPHIL NFR BLD AUTO: 8 % (ref 0–6)
ERYTHROCYTE [DISTWIDTH] IN BLOOD BY AUTOMATED COUNT: 12.7 % (ref 11.6–15.1)
GFR SERPL CREATININE-BSD FRML MDRD: 64 ML/MIN/1.73SQ M
GLUCOSE P FAST SERPL-MCNC: 113 MG/DL (ref 65–99)
HCT VFR BLD AUTO: 57.6 % (ref 36.5–49.3)
HDLC SERPL-MCNC: 33 MG/DL
HGB BLD-MCNC: 19 G/DL (ref 12–17)
IMM GRANULOCYTES # BLD AUTO: 0.02 THOUSAND/UL (ref 0–0.2)
IMM GRANULOCYTES NFR BLD AUTO: 0 % (ref 0–2)
LDLC SERPL CALC-MCNC: 193 MG/DL (ref 0–100)
LYMPHOCYTES # BLD AUTO: 2.78 THOUSANDS/ΜL (ref 0.6–4.47)
LYMPHOCYTES NFR BLD AUTO: 36 % (ref 14–44)
MCH RBC QN AUTO: 29.7 PG (ref 26.8–34.3)
MCHC RBC AUTO-ENTMCNC: 33 G/DL (ref 31.4–37.4)
MCV RBC AUTO: 90 FL (ref 82–98)
MONOCYTES # BLD AUTO: 0.37 THOUSAND/ΜL (ref 0.17–1.22)
MONOCYTES NFR BLD AUTO: 5 % (ref 4–12)
NEUTROPHILS # BLD AUTO: 3.83 THOUSANDS/ΜL (ref 1.85–7.62)
NEUTS SEG NFR BLD AUTO: 50 % (ref 43–75)
NONHDLC SERPL-MCNC: 255 MG/DL
NRBC BLD AUTO-RTO: 0 /100 WBCS
PLATELET # BLD AUTO: 206 THOUSANDS/UL (ref 149–390)
PMV BLD AUTO: 9.3 FL (ref 8.9–12.7)
POTASSIUM SERPL-SCNC: 4.6 MMOL/L (ref 3.5–5.3)
PROT SERPL-MCNC: 7.9 G/DL (ref 6.4–8.2)
RBC # BLD AUTO: 6.39 MILLION/UL (ref 3.88–5.62)
SODIUM SERPL-SCNC: 136 MMOL/L (ref 136–145)
TRIGL SERPL-MCNC: 310 MG/DL
TSH SERPL DL<=0.05 MIU/L-ACNC: 1.8 UIU/ML (ref 0.36–3.74)
WBC # BLD AUTO: 7.7 THOUSAND/UL (ref 4.31–10.16)

## 2022-02-07 PROCEDURE — 3008F BODY MASS INDEX DOCD: CPT | Performed by: FAMILY MEDICINE

## 2022-02-07 PROCEDURE — 84443 ASSAY THYROID STIM HORMONE: CPT

## 2022-02-07 PROCEDURE — 1036F TOBACCO NON-USER: CPT | Performed by: FAMILY MEDICINE

## 2022-02-07 PROCEDURE — 80053 COMPREHEN METABOLIC PANEL: CPT

## 2022-02-07 PROCEDURE — 36415 COLL VENOUS BLD VENIPUNCTURE: CPT

## 2022-02-07 PROCEDURE — 80061 LIPID PANEL: CPT

## 2022-02-07 PROCEDURE — 99396 PREV VISIT EST AGE 40-64: CPT | Performed by: FAMILY MEDICINE

## 2022-02-07 PROCEDURE — 85025 COMPLETE CBC W/AUTO DIFF WBC: CPT

## 2022-02-07 NOTE — ASSESSMENT & PLAN NOTE
Patient presented to the office today for his annual physical   Patient's physical exam was unremarkable  He is exercising regularly  In addition to lifting weights he is doing cardio now as well  I ordered routine fasting blood work  He will return to the office in 1 year

## 2022-02-07 NOTE — PROGRESS NOTES
Assessment/Plan:    Annual physical exam  Patient presented to the office today for his annual physical   Patient's physical exam was unremarkable  He is exercising regularly  In addition to lifting weights he is doing cardio now as well  I ordered routine fasting blood work  He will return to the office in 1 year  Diagnoses and all orders for this visit:    Annual physical exam  -     Lipid panel; Future  -     TSH, 3rd generation with Free T4 reflex; Future  -     Comprehensive metabolic panel; Future  -     CBC and differential; Future    Other orders  -     Boswellia-Glucosamine-Vit D (OSTEO BI-FLEX ONE PER DAY PO); Take 2 tablets by mouth in the morning        BMI Counseling: Body mass index is 33 03 kg/m²  The BMI is above normal  Nutrition recommendations include moderation in carbohydrate intake  Rationale for BMI follow-up plan is due to patient being overweight or obese  Subjective:      Patient ID: Milton Mccormick is a 42 y o  male  This is a 53-year-old white male presents to the office today for his annual physical   The patient requires a physical for his place of employment  He reports he would like age-appropriate testing as well  He is exercising regularly  He lifts weights and does cardio  He denies any sequelae from his COVID-19 virus infection  He tells me he is now taking Osteo Bi-Flex and tells me his shoulder pain resolved after taking it  The following portions of the patient's history were reviewed and updated as appropriate: allergies, current medications, past family history, past medical history, past social history, past surgical history and problem list     Review of Systems   HENT:        Reports anosmia   Respiratory: Negative for cough and shortness of breath  Cardiovascular: Negative for chest pain, palpitations and leg swelling  Gastrointestinal: Negative for abdominal distention, abdominal pain, blood in stool, constipation and diarrhea  Objective:      /76   Pulse 81   Temp 98 2 °F (36 8 °C) (Tympanic)   Ht 5' 8" (1 727 m)   Wt 98 5 kg (217 lb 3 2 oz)   SpO2 98%   BMI 33 03 kg/m²          Physical Exam  Vitals reviewed  Constitutional:       Comments: This is a 62-year-old white male who appears his stated age  He is pleasant, cooperative, and in no distress  HENT:      Head: Normocephalic and atraumatic  Right Ear: Tympanic membrane, ear canal and external ear normal       Left Ear: Tympanic membrane, ear canal and external ear normal       Mouth/Throat:      Mouth: Mucous membranes are moist       Pharynx: Oropharynx is clear  No oropharyngeal exudate  Eyes:      General: No scleral icterus  Right eye: No discharge  Left eye: No discharge  Conjunctiva/sclera: Conjunctivae normal       Pupils: Pupils are equal, round, and reactive to light  Cardiovascular:      Rate and Rhythm: Normal rate and regular rhythm  Heart sounds: Normal heart sounds  No murmur heard  No friction rub  No gallop  Pulmonary:      Effort: Pulmonary effort is normal  No respiratory distress  Breath sounds: Normal breath sounds  No stridor  No wheezing, rhonchi or rales  Abdominal:      General: Bowel sounds are normal  There is no distension  Palpations: Abdomen is soft  There is no mass  Tenderness: There is no abdominal tenderness  There is no guarding  Comments: No organomegaly noted   Musculoskeletal:      Cervical back: Neck supple  Lymphadenopathy:      Cervical: No cervical adenopathy  Psychiatric:         Mood and Affect: Mood normal          Behavior: Behavior normal          Thought Content:  Thought content normal          Judgment: Judgment normal        extremities:  Without cyanosis, clubbing, or edema

## 2022-06-10 ENCOUNTER — OFFICE VISIT (OUTPATIENT)
Dept: FAMILY MEDICINE CLINIC | Facility: CLINIC | Age: 47
End: 2022-06-10
Payer: COMMERCIAL

## 2022-06-10 VITALS
OXYGEN SATURATION: 97 % | TEMPERATURE: 97.9 F | HEART RATE: 81 BPM | DIASTOLIC BLOOD PRESSURE: 84 MMHG | WEIGHT: 210.5 LBS | BODY MASS INDEX: 31.9 KG/M2 | SYSTOLIC BLOOD PRESSURE: 144 MMHG | HEIGHT: 68 IN

## 2022-06-10 DIAGNOSIS — L03.115 CELLULITIS OF RIGHT LEG: Primary | ICD-10-CM

## 2022-06-10 PROCEDURE — 1036F TOBACCO NON-USER: CPT | Performed by: FAMILY MEDICINE

## 2022-06-10 PROCEDURE — 3008F BODY MASS INDEX DOCD: CPT | Performed by: FAMILY MEDICINE

## 2022-06-10 PROCEDURE — 99213 OFFICE O/P EST LOW 20 MIN: CPT | Performed by: FAMILY MEDICINE

## 2022-06-10 RX ORDER — METHYLPREDNISOLONE 4 MG/1
TABLET ORAL
Qty: 21 EACH | Refills: 0 | Status: SHIPPED | OUTPATIENT
Start: 2022-06-10

## 2022-06-10 RX ORDER — CEFADROXIL 500 MG/1
500 CAPSULE ORAL EVERY 12 HOURS SCHEDULED
Qty: 20 CAPSULE | Refills: 0 | Status: SHIPPED | OUTPATIENT
Start: 2022-06-10 | End: 2022-06-20

## 2022-06-10 NOTE — ASSESSMENT & PLAN NOTE
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

## 2022-06-10 NOTE — PROGRESS NOTES
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  I started the patient on cefadroxil 500 mg  He will take 1 capsule twice a day  I advised him to take it on empty stomach  I also started the patient on a Medrol Dosepak to take as directed  The patient has been instructed to start the medication right away  Call if no improvement or worsens  Elevating the leg would help but he tells me he needs to money and must continue working  He cannot take a day off to rest       Diagnoses and all orders for this visit:    Cellulitis of right leg  -     cefadroxil (DURICEF) 500 mg capsule; Take 1 capsule (500 mg total) by mouth every 12 (twelve) hours for 10 days  -     methylPREDNISolone 4 MG tablet therapy pack; Use as directed on package          Subjective:      Patient ID: Jose Antonio Palomares is a 42 y o  male  This is a 82-year-old white male who presents to the office today stating that when he woke up yesterday morning, he felt fine  He went to work  He tells me he kneeled on his right knee and felt pain  This morning, when he woke up, his right lower leg was very red  He denies any fever or chills  Denies any itching  He denies any recent cuts  Tells me he does work around metal shavings but he does not recall having any foreign body  The following portions of the patient's history were reviewed and updated as appropriate: allergies, current medications, past family history, past medical history, past social history, past surgical history and problem list     Review of Systems   Constitutional: Negative for chills, diaphoresis and fever  Respiratory: Negative for cough and shortness of breath      Gastrointestinal: Negative for abdominal distention, abdominal pain, blood in stool, constipation, diarrhea and nausea  Objective:      /84 (BP Location: Left arm, Patient Position: Sitting, Cuff Size: Large)   Pulse 81   Temp 97 9 °F (36 6 °C) (Tympanic)   Ht 5' 8" (1 727 m)   Wt 95 5 kg (210 lb 8 oz)   SpO2 97%   BMI 32 01 kg/m²          Physical Exam  Vitals reviewed  Constitutional:       Comments: Patient is a 24-year-old white male who appears his stated age  He is nonseptic in appearance and in no apparent distress   Cardiovascular:      Rate and Rhythm: Normal rate and regular rhythm  Heart sounds: Normal heart sounds  No murmur heard  No friction rub  No gallop  Comments: Homans sign is negative  Pulmonary:      Effort: Pulmonary effort is normal  No respiratory distress  Breath sounds: Normal breath sounds  No stridor  No wheezing, rhonchi or rales  Musculoskeletal:      Comments: There was no knee effusion present  Patella was non ballotable  Anterior and posterior drawer signs were negative  Lachman's sign was negative  No varus or valgus instability  Dalton's test was negative  Skin:     Comments: There is erythema and heat to the touch present of the skin of the anterior right leg  This is below the knee comes up to the inferior aspect of the knee involves a bit of the lateral aspect of the knee  The border is well delineated  There were no toxic striations  The erythematous area was tender  There was no swelling    No foreign body detected, no puncture wounds or abrasions in the skin

## 2024-02-05 ENCOUNTER — OFFICE VISIT (OUTPATIENT)
Dept: FAMILY MEDICINE CLINIC | Facility: CLINIC | Age: 49
End: 2024-02-05
Payer: COMMERCIAL

## 2024-02-05 VITALS
WEIGHT: 209.9 LBS | DIASTOLIC BLOOD PRESSURE: 81 MMHG | TEMPERATURE: 98.3 F | OXYGEN SATURATION: 96 % | HEART RATE: 91 BPM | BODY MASS INDEX: 31.09 KG/M2 | SYSTOLIC BLOOD PRESSURE: 155 MMHG | HEIGHT: 69 IN

## 2024-02-05 DIAGNOSIS — Z12.11 COLON CANCER SCREENING: ICD-10-CM

## 2024-02-05 DIAGNOSIS — H81.10 BENIGN PAROXYSMAL POSITIONAL VERTIGO, UNSPECIFIED LATERALITY: ICD-10-CM

## 2024-02-05 DIAGNOSIS — Z00.00 ANNUAL PHYSICAL EXAM: Primary | ICD-10-CM

## 2024-02-05 PROCEDURE — 99396 PREV VISIT EST AGE 40-64: CPT | Performed by: FAMILY MEDICINE
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
## 2024-02-09 ENCOUNTER — APPOINTMENT (OUTPATIENT)
Dept: LAB | Facility: HOSPITAL | Age: 49
End: 2024-02-09
Payer: COMMERCIAL

## 2024-02-09 DIAGNOSIS — Z00.00 ANNUAL PHYSICAL EXAM: ICD-10-CM

## 2024-02-09 LAB
ALBUMIN SERPL BCP-MCNC: 4.9 G/DL (ref 3.5–5)
ALP SERPL-CCNC: 53 U/L (ref 34–104)
ALT SERPL W P-5'-P-CCNC: 24 U/L (ref 7–52)
ANION GAP SERPL CALCULATED.3IONS-SCNC: 6 MMOL/L
AST SERPL W P-5'-P-CCNC: 19 U/L (ref 13–39)
BASOPHILS # BLD AUTO: 0.05 THOUSANDS/ÂΜL (ref 0–0.1)
BASOPHILS NFR BLD AUTO: 1 % (ref 0–1)
BILIRUB SERPL-MCNC: 1.19 MG/DL (ref 0.2–1)
BUN SERPL-MCNC: 20 MG/DL (ref 5–25)
CALCIUM SERPL-MCNC: 10 MG/DL (ref 8.4–10.2)
CHLORIDE SERPL-SCNC: 103 MMOL/L (ref 96–108)
CHOLEST SERPL-MCNC: 282 MG/DL
CO2 SERPL-SCNC: 31 MMOL/L (ref 21–32)
CREAT SERPL-MCNC: 1.22 MG/DL (ref 0.6–1.3)
EOSINOPHIL # BLD AUTO: 0.48 THOUSAND/ÂΜL (ref 0–0.61)
EOSINOPHIL NFR BLD AUTO: 6 % (ref 0–6)
ERYTHROCYTE [DISTWIDTH] IN BLOOD BY AUTOMATED COUNT: 12.4 % (ref 11.6–15.1)
GFR SERPL CREATININE-BSD FRML MDRD: 70 ML/MIN/1.73SQ M
GLUCOSE P FAST SERPL-MCNC: 116 MG/DL (ref 65–99)
HCT VFR BLD AUTO: 55 % (ref 36.5–49.3)
HDLC SERPL-MCNC: 40 MG/DL
HGB BLD-MCNC: 18.6 G/DL (ref 12–17)
IMM GRANULOCYTES # BLD AUTO: 0.01 THOUSAND/UL (ref 0–0.2)
IMM GRANULOCYTES NFR BLD AUTO: 0 % (ref 0–2)
LDLC SERPL CALC-MCNC: 200 MG/DL (ref 0–100)
LYMPHOCYTES # BLD AUTO: 3.07 THOUSANDS/ÂΜL (ref 0.6–4.47)
LYMPHOCYTES NFR BLD AUTO: 41 % (ref 14–44)
MCH RBC QN AUTO: 30.1 PG (ref 26.8–34.3)
MCHC RBC AUTO-ENTMCNC: 33.8 G/DL (ref 31.4–37.4)
MCV RBC AUTO: 89 FL (ref 82–98)
MONOCYTES # BLD AUTO: 0.38 THOUSAND/ÂΜL (ref 0.17–1.22)
MONOCYTES NFR BLD AUTO: 5 % (ref 4–12)
NEUTROPHILS # BLD AUTO: 3.53 THOUSANDS/ÂΜL (ref 1.85–7.62)
NEUTS SEG NFR BLD AUTO: 47 % (ref 43–75)
NRBC BLD AUTO-RTO: 0 /100 WBCS
PLATELET # BLD AUTO: 205 THOUSANDS/UL (ref 149–390)
PMV BLD AUTO: 8.9 FL (ref 8.9–12.7)
POTASSIUM SERPL-SCNC: 3.9 MMOL/L (ref 3.5–5.3)
PROT SERPL-MCNC: 7.8 G/DL (ref 6.4–8.4)
RBC # BLD AUTO: 6.18 MILLION/UL (ref 3.88–5.62)
SODIUM SERPL-SCNC: 140 MMOL/L (ref 135–147)
TRIGL SERPL-MCNC: 210 MG/DL
TSH SERPL DL<=0.05 MIU/L-ACNC: 1.22 UIU/ML (ref 0.45–4.5)
WBC # BLD AUTO: 7.52 THOUSAND/UL (ref 4.31–10.16)

## 2024-02-09 PROCEDURE — 36415 COLL VENOUS BLD VENIPUNCTURE: CPT

## 2024-02-09 PROCEDURE — 84443 ASSAY THYROID STIM HORMONE: CPT

## 2024-02-09 PROCEDURE — 85025 COMPLETE CBC W/AUTO DIFF WBC: CPT

## 2024-02-09 PROCEDURE — 80061 LIPID PANEL: CPT

## 2024-02-09 PROCEDURE — 80053 COMPREHEN METABOLIC PANEL: CPT

## 2024-02-12 DIAGNOSIS — E78.2 MIXED HYPERLIPIDEMIA: Primary | ICD-10-CM

## 2024-02-12 DIAGNOSIS — D75.1 ERYTHROCYTOSIS: ICD-10-CM

## 2024-02-12 RX ORDER — ROSUVASTATIN CALCIUM 10 MG/1
10 TABLET, COATED ORAL DAILY
Qty: 90 TABLET | Refills: 3 | Status: SHIPPED | OUTPATIENT
Start: 2024-02-12

## 2024-02-21 DIAGNOSIS — Z12.11 COLON CANCER SCREENING: Primary | ICD-10-CM
